# Patient Record
Sex: MALE | Race: WHITE | NOT HISPANIC OR LATINO | Employment: FULL TIME | URBAN - METROPOLITAN AREA
[De-identification: names, ages, dates, MRNs, and addresses within clinical notes are randomized per-mention and may not be internally consistent; named-entity substitution may affect disease eponyms.]

---

## 2017-01-18 ENCOUNTER — DOCUMENTATION ONLY (OUTPATIENT)
Dept: FAMILY MEDICINE | Facility: CLINIC | Age: 22
End: 2017-01-18

## 2017-01-18 NOTE — PROGRESS NOTES
Pre-Visit Chart Review  For Appointment Scheduled on 01-23-17    Health Maintenance Due   Topic Date Due    Lipid Panel  1994    TETANUS VACCINE  12/02/2012    Influenza Vaccine  08/01/2016

## 2017-01-23 ENCOUNTER — OFFICE VISIT (OUTPATIENT)
Dept: FAMILY MEDICINE | Facility: CLINIC | Age: 22
End: 2017-01-23
Payer: COMMERCIAL

## 2017-01-23 VITALS
OXYGEN SATURATION: 97 % | BODY MASS INDEX: 27.33 KG/M2 | WEIGHT: 184.5 LBS | TEMPERATURE: 98 F | RESPIRATION RATE: 20 BRPM | DIASTOLIC BLOOD PRESSURE: 70 MMHG | HEART RATE: 79 BPM | HEIGHT: 69 IN | SYSTOLIC BLOOD PRESSURE: 130 MMHG

## 2017-01-23 DIAGNOSIS — Z00.00 ANNUAL PHYSICAL EXAM: ICD-10-CM

## 2017-01-23 DIAGNOSIS — Z76.89 ESTABLISHING CARE WITH NEW DOCTOR, ENCOUNTER FOR: Primary | ICD-10-CM

## 2017-01-23 PROCEDURE — 99999 PR PBB SHADOW E&M-EST. PATIENT-LVL III: CPT | Mod: PBBFAC,,, | Performed by: FAMILY MEDICINE

## 2017-01-23 PROCEDURE — 99395 PREV VISIT EST AGE 18-39: CPT | Mod: S$GLB,,, | Performed by: FAMILY MEDICINE

## 2017-01-23 NOTE — PROGRESS NOTES
Subjective:       Patient ID: Chuy Barry is a 21 y.o. male.    Chief Complaint: Establish Care    HPI Comments: 21 year old male in to establish care.  He has no active complaints.  When he was 17 years he was jumping on some piling and lost his  falling face down on a hard surface, sustaining a skull fracture and losing consciousness for about 20 seconds.  He has no known sequelae, no seizures, no impaired congnition, etc.    Past Medical History:    Skull fracture                                                  Comment:with concussion    Past Surgical History:    TONSILLECTOMY                                                  WISDOM TOOTH EXTRACTION                                        Review of patient's family history indicates:    No Known Problems              Mother                    No Known Problems              Father                    No Known Problems              Maternal Uncle            No Known Problems              Maternal Grandmother      No Known Problems              Maternal Grandfather      No Known Problems              Paternal Grandmother      No Known Problems              Paternal Grandfather      Social History    Marital status: Single              Spouse name:                       Years of education:                 Number of children:               Social History Main Topics    Smoking status: Never Smoker                                                                Smokeless status: Never Used                        Alcohol use: No              Drug use: No              No current outpatient prescriptions on file.    Lipid Panel due on 1994  TETANUS VACCINE due on 12/02/2012  Influenza Vaccine due on 08/01/2016      Review of Systems   Constitutional: Negative for activity change, chills, fatigue and fever.   HENT: Negative for congestion, ear pain, rhinorrhea and sore throat.    Eyes: Negative for visual disturbance.   Respiratory: Negative for cough,  chest tightness and shortness of breath.    Cardiovascular: Negative for chest pain and palpitations.   Gastrointestinal: Negative for abdominal pain, blood in stool, constipation, diarrhea, nausea and vomiting.   Genitourinary: Negative for difficulty urinating, dysuria and hematuria.   Musculoskeletal: Negative for arthralgias and neck pain.   Skin: Negative for rash.   Neurological: Negative for dizziness and headaches.   Psychiatric/Behavioral: Negative for sleep disturbance.       Objective:      Physical Exam   Constitutional: He is oriented to person, place, and time. He appears well-developed and well-nourished. No distress.   Good blood pressure control  Patient is overweight but muscular/athletic with bmi of 27.3.        HENT:   Head: Normocephalic and atraumatic.   Right Ear: External ear normal.   Left Ear: External ear normal.   Nose: Nose normal.   Mouth/Throat: Oropharynx is clear and moist. No oropharyngeal exudate.   Eyes: Conjunctivae and EOM are normal. Pupils are equal, round, and reactive to light. No scleral icterus.   Neck: Normal range of motion. Neck supple. No JVD present. No tracheal deviation present. No thyromegaly present.   Cardiovascular: Normal rate, regular rhythm and normal heart sounds.  Exam reveals no gallop and no friction rub.    No murmur heard.  Pulmonary/Chest: Effort normal and breath sounds normal. No respiratory distress. He has no wheezes. He has no rales. He exhibits no tenderness.   Abdominal: Soft. Bowel sounds are normal. He exhibits no distension and no mass. There is no tenderness. There is no rebound and no guarding. No hernia.   Musculoskeletal: Normal range of motion. He exhibits no edema or deformity.   Lymphadenopathy:     He has no cervical adenopathy.   Neurological: He is alert and oriented to person, place, and time. He has normal reflexes. He displays normal reflexes. No cranial nerve deficit. He exhibits normal muscle tone.   Skin: Skin is warm and dry.  No rash noted. He is not diaphoretic.   Psychiatric: He has a normal mood and affect. His behavior is normal. Judgment and thought content normal.   Nursing note and vitals reviewed.      Assessment:       1. Establishing care with new doctor, encounter for    2. Annual physical exam    3. BMI 27.0-27.9,adult        Plan:       1. Establishing care with new doctor, encounter for  No active problems    2. Annual physical exam  - Lipid panel; Future  - Comprehensive metabolic panel; Future  - CBC auto differential; Future    3. BMI 27.0-27.9,adult

## 2017-01-23 NOTE — MR AVS SNAPSHOT
"    [facility]  [address] [place] [address]  Phone: [phone]  Fax: [phone]                  Chuy Barry   2017 2:40 PM   Office Visit    Description:  Male : 1995   Provider:  Mikie Teresa MD   Department:  [facility]           Reason for Visit     Establish Care           Diagnoses this Visit        Comments    Establishing care with new doctor, encounter for    -  Primary     Annual physical exam         BMI 27.0-27.9,adult                To Do List           Future Appointments        Provider Department Dept Phone    2017 9:30 AM [facility]IC[facility] RASHID [facility] [phone]      Goals (5 Years of Data)     None      [facility]     [facility] On Call Nurse Care Line -  Assistance  Registered nurses in the [facility] Call Center provide clinical advisement, health education, appointment booking, and other advisory services.  Call for this free service at [phone].             Medications           Message regarding Medications     Verify the changes and/or additions to your medication regime listed below are the same as discussed with your clinician today.  If any of these changes or additions are incorrect, please notify your healthcare provider.             Verify that the below list of medications is an accurate representation of the medications you are currently taking.  If none reported, the list may be blank. If incorrect, please contact your healthcare provider. Carry this list with you in case of emergency.                Clinical Reference Information           Vital Signs - Last Recorded  Most recent update: 2017  3:05 PM by Lisbeth Floyd MA    BP Pulse Temp Resp Ht Wt    130/70 (BP Location: Right arm, Patient Position: Sitting, BP Method: Automatic) 79 98.2 °F (36.8 °C) (Oral) 20 5' 9" (1.753 m) 83.7 kg (184 lb 8.4 oz)    SpO2 BMI             97% 27.25 kg/m2         Blood Pressure          Most " Recent Value    BP  130/70      Allergies as of 1/23/2017     No Known Allergies      Immunizations Administered on Date of Encounter - 1/23/2017     None      Orders Placed During Today's Visit     Future Labs/Procedures Expected by Expires    CBC auto differential  1/23/2017 1/24/2018    Comprehensive metabolic panel  1/23/2017 1/24/2018    Lipid panel  1/23/2017 3/24/2018

## 2023-02-03 ENCOUNTER — OCCUPATIONAL HEALTH (OUTPATIENT)
Dept: URGENT CARE | Facility: CLINIC | Age: 28
End: 2023-02-03

## 2023-02-03 DIAGNOSIS — Z13.9 ENCOUNTER FOR SCREENING: Primary | ICD-10-CM

## 2023-02-03 PROCEDURE — 94010 PULMONARY FUNCTION SCREENING (OCC MED PHYSICALS): ICD-10-PCS | Mod: S$GLB,,, | Performed by: STUDENT IN AN ORGANIZED HEALTH CARE EDUCATION/TRAINING PROGRAM

## 2023-02-03 PROCEDURE — 99080 SPECIAL REPORTS OR FORMS: CPT | Mod: S$GLB,,, | Performed by: STUDENT IN AN ORGANIZED HEALTH CARE EDUCATION/TRAINING PROGRAM

## 2023-02-03 PROCEDURE — 94010 BREATHING CAPACITY TEST: CPT | Mod: S$GLB,,, | Performed by: STUDENT IN AN ORGANIZED HEALTH CARE EDUCATION/TRAINING PROGRAM

## 2023-02-03 PROCEDURE — 99080 OSHA QUESTIONNAIRE: ICD-10-PCS | Mod: S$GLB,,, | Performed by: STUDENT IN AN ORGANIZED HEALTH CARE EDUCATION/TRAINING PROGRAM

## 2023-04-13 ENCOUNTER — OFFICE VISIT (OUTPATIENT)
Dept: URGENT CARE | Facility: CLINIC | Age: 28
End: 2023-04-13
Payer: OTHER MISCELLANEOUS

## 2023-04-13 VITALS
SYSTOLIC BLOOD PRESSURE: 115 MMHG | BODY MASS INDEX: 28.14 KG/M2 | RESPIRATION RATE: 16 BRPM | HEART RATE: 75 BPM | OXYGEN SATURATION: 98 % | WEIGHT: 190 LBS | DIASTOLIC BLOOD PRESSURE: 76 MMHG | HEIGHT: 69 IN | TEMPERATURE: 99 F

## 2023-04-13 DIAGNOSIS — J02.9 SORE THROAT: ICD-10-CM

## 2023-04-13 DIAGNOSIS — J06.9 VIRAL URI: Primary | ICD-10-CM

## 2023-04-13 LAB
CTP QC/QA: YES
MOLECULAR STREP A: NEGATIVE
POC MOLECULAR INFLUENZA A AGN: NEGATIVE
POC MOLECULAR INFLUENZA B AGN: NEGATIVE
SARS-COV-2 AG RESP QL IA.RAPID: NEGATIVE

## 2023-04-13 PROCEDURE — 87502 POCT INFLUENZA A/B MOLECULAR: ICD-10-PCS | Mod: QW,S$GLB,, | Performed by: EMERGENCY MEDICINE

## 2023-04-13 PROCEDURE — 99203 OFFICE O/P NEW LOW 30 MIN: CPT | Mod: S$GLB,,, | Performed by: EMERGENCY MEDICINE

## 2023-04-13 PROCEDURE — 87651 STREP A DNA AMP PROBE: CPT | Mod: QW,S$GLB,, | Performed by: EMERGENCY MEDICINE

## 2023-04-13 PROCEDURE — 87811 SARS-COV-2 COVID19 W/OPTIC: CPT | Mod: QW,S$GLB,, | Performed by: EMERGENCY MEDICINE

## 2023-04-13 PROCEDURE — 87651 POCT STREP A MOLECULAR: ICD-10-PCS | Mod: QW,S$GLB,, | Performed by: EMERGENCY MEDICINE

## 2023-04-13 PROCEDURE — 87811 SARS CORONAVIRUS 2 ANTIGEN POCT, MANUAL READ: ICD-10-PCS | Mod: QW,S$GLB,, | Performed by: EMERGENCY MEDICINE

## 2023-04-13 PROCEDURE — 87502 INFLUENZA DNA AMP PROBE: CPT | Mod: QW,S$GLB,, | Performed by: EMERGENCY MEDICINE

## 2023-04-13 PROCEDURE — 99203 PR OFFICE/OUTPT VISIT, NEW, LEVL III, 30-44 MIN: ICD-10-PCS | Mod: S$GLB,,, | Performed by: EMERGENCY MEDICINE

## 2023-04-13 NOTE — PROGRESS NOTES
Subjective:      Patient ID: Chuy Barry is a 27 y.o. male.    Chief Complaint: Sore Throat    Pt is coming in with cough and sore throat that started about two days ago. Pt says he isn't sure if he was exposed to anything. Pt took OTC cold medication with mild relief.      PATIENT WAS AT A SAFETY MEETING HOWEVER HAS HAD 36 HOURS OF MILD URI SYMPTOMS INCLUDING SORE THROAT AND CONGESTION AND COUGH.  NO FEVERS NO CHILLS.  COVID STREP AND FLU NEGATIVE HERE IN CLINIC.  WILL ENCOURAGE SUPPORTIVE CARE INCLUDING REST HYDRATION OVER-THE-COUNTER COUGH AND COLD MEDICATION AND IBUPROFEN FOR SORE THROAT.  MAY RETURN TO WORK WHEN FEVER FREE FOR 24 HOURS.    Sore Throat   This is a new problem. The current episode started in the past 7 days. The problem has been gradually worsening. Neither side of throat is experiencing more pain than the other. The pain is at a severity of 2/10. The pain is mild. Associated symptoms include coughing. Pertinent negatives include no neck pain or shortness of breath. He has had no exposure to strep or mono. Treatments tried: muscinex. The treatment provided mild relief.     ROS    Constitution: Negative for chills and fever.   HENT:  Positive for sore throat. Negative for postnasal drip and sinus pain.    Neck: Negative for neck pain and neck stiffness.   Cardiovascular:  Negative for chest pain and palpitations.   Respiratory:  Positive for cough. Negative for shortness of breath.    Genitourinary:  Negative for dysuria and hematuria.   Musculoskeletal:  Negative for joint pain.   Skin:  Negative for wound and laceration.   Neurological:  Negative for altered mental status, numbness and tingling.   Psychiatric/Behavioral:  Negative for altered mental status.    Objective:     Physical Exam  Vitals and nursing note reviewed.   Constitutional:       Appearance: He is well-developed.   HENT:      Head: Normocephalic and atraumatic.      Right Ear: External ear normal.      Left Ear:  External ear normal.      Nose: Congestion present.   Eyes:      Conjunctiva/sclera: Conjunctivae normal.   Cardiovascular:      Rate and Rhythm: Normal rate and regular rhythm.   Pulmonary:      Effort: Pulmonary effort is normal.      Breath sounds: Normal breath sounds. No wheezing.   Abdominal:      General: Bowel sounds are normal.      Palpations: Abdomen is soft.   Musculoskeletal:      Cervical back: Normal range of motion and neck supple.   Lymphadenopathy:      Cervical: No cervical adenopathy.   Neurological:      Mental Status: He is alert and oriented to person, place, and time.   Psychiatric:         Behavior: Behavior normal.       Results for orders placed or performed in visit on 04/13/23   POCT Strep A, Molecular   Result Value Ref Range    Molecular Strep A, POC Negative Negative     Acceptable Yes    POCT Influenza A/B MOLECULAR   Result Value Ref Range    POC Molecular Influenza A Ag Negative Negative, Not Reported    POC Molecular Influenza B Ag Negative Negative, Not Reported     Acceptable Yes    SARS Coronavirus 2 Antigen, POCT Manual Read   Result Value Ref Range    SARS Coronavirus 2 Antigen Negative Negative     Acceptable Yes          Assessment:      1. Viral URI    2. Sore throat      Plan:     PATIENT WAS AT A SAFETY MEETING HOWEVER HAS HAD 36 HOURS OF MILD URI SYMPTOMS INCLUDING SORE THROAT AND CONGESTION AND COUGH.  NO FEVERS NO CHILLS.  COVID STREP AND FLU NEGATIVE HERE IN CLINIC.  WILL ENCOURAGE SUPPORTIVE CARE INCLUDING REST HYDRATION OVER-THE-COUNTER COUGH AND COLD MEDICATION AND IBUPROFEN FOR SORE THROAT.  MAY RETURN TO WORK WHEN FEVER FREE FOR 24 HOURS.     Patient Instructions:  (FLU COVID AND STREP NEGATIVE. MAY RETURN TO WORK WHEN FEVER FREE FOR 24 HOURS)   Restrictions: Regular Duty  Follow up if symptoms worsen or fail to improve.

## 2023-04-13 NOTE — PATIENT INSTRUCTIONS
COVID SWAB NEGATIVE AT CLINIC  STREP TEST AND FLU SWAB NEGATIVE AT CLINIC   REST AND HYDRATE WITH PLENTY OF FLUIDS  OVER-THE-COUNTER COUGH AND COLD MEDICINE AS NEEDED   OVER-THE-COUNTER IBUPROFEN 600 MILLIGRAMS EVERY 6 HOURS FOR SORE THROAT AS NEEDED.  REVIEW VIRAL UPPER RESPIRATORY INFECTION SHEET.

## 2023-04-13 NOTE — LETTER
[facility]  [address] [facility], [address]  [place] [address]  Phone: [phone]  Fax: [phone]  [facility]: [phone]    Pt Name: Chuy Barry  Injury Date: 04/13/2023   Employee ID:  Date of First Treatment: 04/13/2023   Company: [employer] to record [id number]      Appointment Time: 11:50 AM Arrived: 11:36 am   Provider: Santo Lynn MD Time Out:1:00 pm     Office Treatment:   1. Viral URI    2. Sore throat          Patient Instructions:  (FLU COVID AND STREP NEGATIVE. MAY RETURN TO WORK WHEN FEVER FREE FOR 24 HOURS)    Restrictions: Regular Duty     Return Appointment: N/A  SW